# Patient Record
Sex: MALE | Race: WHITE | NOT HISPANIC OR LATINO
[De-identification: names, ages, dates, MRNs, and addresses within clinical notes are randomized per-mention and may not be internally consistent; named-entity substitution may affect disease eponyms.]

---

## 2022-04-01 PROBLEM — Z00.00 ENCOUNTER FOR PREVENTIVE HEALTH EXAMINATION: Status: ACTIVE | Noted: 2022-04-01

## 2022-04-04 ENCOUNTER — APPOINTMENT (OUTPATIENT)
Dept: CT IMAGING | Facility: CLINIC | Age: 77
End: 2022-04-04
Payer: MEDICARE

## 2022-04-04 PROCEDURE — 76376 3D RENDER W/INTRP POSTPROCES: CPT | Mod: 59,LT

## 2022-04-04 PROCEDURE — 73700 CT LOWER EXTREMITY W/O DYE: CPT | Mod: LT,MH

## 2022-04-07 ENCOUNTER — OUTPATIENT (OUTPATIENT)
Dept: OUTPATIENT SERVICES | Facility: HOSPITAL | Age: 77
LOS: 1 days | Discharge: HOME | End: 2022-04-07
Payer: MEDICARE

## 2022-04-07 DIAGNOSIS — Z01.818 ENCOUNTER FOR OTHER PREPROCEDURAL EXAMINATION: ICD-10-CM

## 2022-04-07 DIAGNOSIS — M16.12 UNILATERAL PRIMARY OSTEOARTHRITIS, LEFT HIP: ICD-10-CM

## 2022-04-07 DIAGNOSIS — Z98.890 OTHER SPECIFIED POSTPROCEDURAL STATES: Chronic | ICD-10-CM

## 2022-04-07 LAB
A1C WITH ESTIMATED AVERAGE GLUCOSE RESULT: 5.7 % — HIGH (ref 4–5.6)
ALBUMIN SERPL ELPH-MCNC: 3.6 G/DL — SIGNIFICANT CHANGE UP (ref 3.3–5)
ALP SERPL-CCNC: 92 U/L — SIGNIFICANT CHANGE UP (ref 40–120)
ALT FLD-CCNC: 91 U/L — HIGH (ref 12–78)
ANION GAP SERPL CALC-SCNC: 5 MMOL/L — SIGNIFICANT CHANGE UP (ref 5–17)
APTT BLD: 30.6 SEC — SIGNIFICANT CHANGE UP (ref 27.5–35.5)
AST SERPL-CCNC: 46 U/L — HIGH (ref 15–37)
BASOPHILS # BLD AUTO: 0.02 K/UL — SIGNIFICANT CHANGE UP (ref 0–0.2)
BASOPHILS NFR BLD AUTO: 0.2 % — SIGNIFICANT CHANGE UP (ref 0–2)
BILIRUB SERPL-MCNC: 0.6 MG/DL — SIGNIFICANT CHANGE UP (ref 0.2–1.2)
BLD GP AB SCN SERPL QL: SIGNIFICANT CHANGE UP
BUN SERPL-MCNC: 21 MG/DL — SIGNIFICANT CHANGE UP (ref 7–23)
CALCIUM SERPL-MCNC: 9.5 MG/DL — SIGNIFICANT CHANGE UP (ref 8.5–10.1)
CHLORIDE SERPL-SCNC: 106 MMOL/L — SIGNIFICANT CHANGE UP (ref 96–108)
CO2 SERPL-SCNC: 28 MMOL/L — SIGNIFICANT CHANGE UP (ref 22–31)
CREAT SERPL-MCNC: 1.16 MG/DL — SIGNIFICANT CHANGE UP (ref 0.5–1.3)
EGFR: 66 ML/MIN/1.73M2 — SIGNIFICANT CHANGE UP
EOSINOPHIL # BLD AUTO: 0.04 K/UL — SIGNIFICANT CHANGE UP (ref 0–0.5)
EOSINOPHIL NFR BLD AUTO: 0.4 % — SIGNIFICANT CHANGE UP (ref 0–6)
ESTIMATED AVERAGE GLUCOSE: 117 MG/DL — HIGH (ref 68–114)
GLUCOSE SERPL-MCNC: 97 MG/DL — SIGNIFICANT CHANGE UP (ref 70–99)
HCT VFR BLD CALC: 47.1 % — SIGNIFICANT CHANGE UP (ref 39–50)
HGB BLD-MCNC: 15.8 G/DL — SIGNIFICANT CHANGE UP (ref 13–17)
IMM GRANULOCYTES NFR BLD AUTO: 0.6 % — SIGNIFICANT CHANGE UP (ref 0–1.5)
INR BLD: 0.99 RATIO — SIGNIFICANT CHANGE UP (ref 0.88–1.16)
LYMPHOCYTES # BLD AUTO: 1.45 K/UL — SIGNIFICANT CHANGE UP (ref 1–3.3)
LYMPHOCYTES # BLD AUTO: 16.3 % — SIGNIFICANT CHANGE UP (ref 13–44)
MCHC RBC-ENTMCNC: 31.2 PG — SIGNIFICANT CHANGE UP (ref 27–34)
MCHC RBC-ENTMCNC: 33.5 G/DL — SIGNIFICANT CHANGE UP (ref 32–36)
MCV RBC AUTO: 92.9 FL — SIGNIFICANT CHANGE UP (ref 80–100)
MONOCYTES # BLD AUTO: 0.85 K/UL — SIGNIFICANT CHANGE UP (ref 0–0.9)
MONOCYTES NFR BLD AUTO: 9.5 % — SIGNIFICANT CHANGE UP (ref 2–14)
NEUTROPHILS # BLD AUTO: 6.5 K/UL — SIGNIFICANT CHANGE UP (ref 1.8–7.4)
NEUTROPHILS NFR BLD AUTO: 73 % — SIGNIFICANT CHANGE UP (ref 43–77)
NRBC # BLD: 0 /100 WBCS — SIGNIFICANT CHANGE UP (ref 0–0)
PLATELET # BLD AUTO: SIGNIFICANT CHANGE UP K/UL (ref 150–400)
POTASSIUM SERPL-MCNC: 4.2 MMOL/L — SIGNIFICANT CHANGE UP (ref 3.5–5.3)
POTASSIUM SERPL-SCNC: 4.2 MMOL/L — SIGNIFICANT CHANGE UP (ref 3.5–5.3)
PROT SERPL-MCNC: 8.6 GM/DL — HIGH (ref 6–8.3)
PROTHROM AB SERPL-ACNC: 11.9 SEC — SIGNIFICANT CHANGE UP (ref 10.5–13.4)
RBC # BLD: 5.07 M/UL — SIGNIFICANT CHANGE UP (ref 4.2–5.8)
RBC # FLD: 12.7 % — SIGNIFICANT CHANGE UP (ref 10.3–14.5)
SODIUM SERPL-SCNC: 139 MMOL/L — SIGNIFICANT CHANGE UP (ref 135–145)
WBC # BLD: 8.91 K/UL — SIGNIFICANT CHANGE UP (ref 3.8–10.5)
WBC # FLD AUTO: 8.91 K/UL — SIGNIFICANT CHANGE UP (ref 3.8–10.5)

## 2022-04-07 PROCEDURE — 93010 ELECTROCARDIOGRAM REPORT: CPT

## 2022-04-07 NOTE — PHYSICAL THERAPY INITIAL EVALUATION ADULT - TINETTI GAIT TEST, REHAB EVAL
Indication of gait -1/1,   Step Length and height -2/2,   Foot Clearance -/22,   Step Symmetry 0 /1,  Step Continuity -1/1,   Path -1/ 2,   Trunk -1/2,   Walking Time -1/1,   Total Score - 9/12

## 2022-04-07 NOTE — OCCUPATIONAL THERAPY INITIAL EVALUATION ADULT - PERTINENT HX OF CURRENT PROBLEM, REHAB EVAL
Pt is a 78 y/o male slated for elective surgery for posterior left  THR with MD Schulte on 4/20/22 due to OA, chronic pain and DJD. Pt denied any  falls in the past 3-6 months

## 2022-04-07 NOTE — H&P PST ADULT - PROBLEM SELECTOR PLAN 2
labs - cbc,pt/ptt,bmp,t&s,nose cx,ekg  M/C required  Instructed on if  nose cx positive use Mupirocin 5 days and checklist given  take routine meds DOS with sips of water. avoid NSAID and OTC supplements. verbalized understanding  information on proper nutrition , increase protein and better food choices provided in packet  anesthesiologist to review PST labs, EKG, medical clearance and optimization for surgery

## 2022-04-07 NOTE — OCCUPATIONAL THERAPY INITIAL EVALUATION ADULT - RANGE OF MOTION EXAMINATION, LOWER EXTREMITY
ROM is limited in left hip due to pain/Left LE Passive ROM was WFL (w/i functional limits)/Left LE Active Assistive ROM was WFL (within functional limits)/Right LE Active ROM was WFL   (within functional limits)/Right LE Passive ROM was WFL  (within functional limits)

## 2022-04-07 NOTE — OCCUPATIONAL THERAPY INITIAL EVALUATION ADULT - PRECAUTIONS/LIMITATIONS, REHAB EVAL
Pt's mobility and ADL management is limited due to pain in left hip/fall precautions Pt's mobility and ADL management is limited due to pain in left hip./fall precautions

## 2022-04-07 NOTE — H&P PST ADULT - HISTORY OF PRESENT ILLNESS
77 year old male with no significant past medical history c/o pain and limited ROM to left hip secondary to osteoarthritis.  He is scheduled for a robotic assisted left hip arthroplasty with SOFI on 4/20/22.    He denies fever, cough, SOB, and sick contacts. + Recent travels to [place] 3/2022    Goal: to walk without pain.

## 2022-04-07 NOTE — OCCUPATIONAL THERAPY INITIAL EVALUATION ADULT - LIVES WITH, PROFILE
in a private house with 6 steps to enter equipped with bilateral hand rails that cannot be reached simultaneously .. Once inside, pt has to negotiate a flight of stairs  6 steps left hadrail , plus a landing and 6 other steps with right handrail, to access the bedroom and bathroom. The bathroom has a walk in shower, fixed /retractable shower head  and  comfort height  toilet with adequate space to fit a commode over it./spouse

## 2022-04-07 NOTE — PHYSICAL THERAPY INITIAL EVALUATION ADULT - ADDITIONAL COMMENTS
There are 5-6 steps, c far travis rails,  at the entry of the house. There are 5-6 steps, c L rail up and followed by a landing and another 5-6 steps c R rail up, to negotiate at home.  Pt has a walk in shower c fixed/retractable shower head,  no grab bar, and comfort height toilet seat in BR. 3-1 commode will fit in BR. Average pain level at rest is 0/10. Pain increases to 9/10 c activities. Pt does not take any pain meds. Pt has no experience of adverse effects with pain medication. Pt is not on out-pt physical therapy at present. There is no h/o fall or knee buckling recently. Pt wears glasses for reading and no need for hearing aid. Pt is R handed and drives.

## 2022-04-07 NOTE — PHYSICAL THERAPY INITIAL EVALUATION ADULT - IMPAIRMENTS FOUND, PT EVAL
aerobic capacity/endurance/ergonomics and body mechanics/gait, locomotion, and balance/joint integrity and mobility/posture

## 2022-04-07 NOTE — OCCUPATIONAL THERAPY INITIAL EVALUATION ADULT - GENERAL OBSERVATIONS, REHAB EVAL
Chart reviewed. Patient encountered seated in chair in rehab preop room in [facility]. Patient underwent occupational therapy pre-operative consultation to determine current functional ADL limitations in order to provide the right equipment for patient to perform functional ADL post operation.

## 2022-04-07 NOTE — OCCUPATIONAL THERAPY INITIAL EVALUATION ADULT - ADDITIONAL COMMENTS
At this time, pt is functioning in his roles, self sufficient, driving & ambulating independently in the community with [employer]. Pt owns no other DME.  Pt c/o 9/10 pain in his left hip. The pain is exacerbated, by walking, prolonged standing, negotiating steps and is relieved with  rest. Pt scores 90% of patient specific scale.

## 2022-04-07 NOTE — PHYSICAL THERAPY INITIAL EVALUATION ADULT - PERTINENT HX OF CURRENT PROBLEM, REHAB EVAL
L knee OA c chronic pain and  limiting functional mobility. Pt is scheduled for L knee elective total joint replacement on 4/20/22  by  Dr. Schulte.

## 2022-04-07 NOTE — PHYSICAL THERAPY INITIAL EVALUATION ADULT - STANDING BALANCE: DYNAMIC, REHAB EVAL
c a straight cane/good minus I have personally performed a face to face diagnostic evaluation on this patient. I have reviewed the ACP note and agree with the history, exam and plan of care, except as noted.

## 2022-04-07 NOTE — PHYSICAL THERAPY INITIAL EVALUATION ADULT - GENERAL OBSERVATIONS, REHAB EVAL
Patient was seen seated  in chair in PT/OT preoperative assessment room with no apparent distress. Height:   6'1"    ; weight :  275   lbs.

## 2022-04-07 NOTE — PHYSICAL THERAPY INITIAL EVALUATION ADULT - TINETTI BALANCE TEST, REHAB EVAL
Sitting Balance - 1/1 , Rises From Chair - 1/2, Attempts to rise - 2/2 , Immediate Standing Balance - 2/2,  Standing Balance - 2/2, Nudged -  2/2, Eyes Closed - 1/1,  Turning 360 Deg -  1/2, Sitting down - 1/2, Balance Score - 13/16

## 2022-04-07 NOTE — H&P PST ADULT - EKG AND INTERPRETATION
[]Hide copied text  [facility] Medicine  H&P     Patient Name: Randall Strickland Jr.  MRN: [id number]  Patient Class: OP- Observation   Admission Date: 2/9/2017  Length of Stay: 0 days  Attending Physician: Mando Garibay MD  Primary Care Provider: Hieu Monk MD     [facility] Medicine Team: [facility] MED  Mando Garibay MD     Subjective:      Principal Problem:Acute cystitis with hematuria     HPI:   Patient is a 89-year-old male with past medical history of HTN, congestive heart, aortic stenosis status post TAVR in Aug 2016 who presents the ED with fever and urinary difficulties and referral to ED from cardiologist. Patient states that his symptoms started 2 days ago. Initially he had increased urinary frequency with incontinence, then had dysuria and some spotty hematuria. Last day his voiding decreased a lot Patient had a fever of 101.3 last night.   He has been taking Excedrin and aspirin. He denies any abdominal plain or flank pain. He denies any diaphoresis, cough, SOB, chest pain, abdominal pain, nausea, vomiting, diarrhea, flank pain, paresthesias, or weakness.     Patient states that he saw his cardiologist today, and was told to report to the ED for further evaluation and management.         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    Stenosis of aortic and mitral valves         aortic DAHLIA 1.1 CM                Past Surgical History   Procedure Laterality Date    Knee surgery           Bilateral    Total hip arthroplasty           Bilaterial    Cararact   car    Cataract extraction bilateral w/ anterior vitrectomy           bilaterial    Cholecystectomy        Appendectomy        Eye surgery        Joint replacement         Cataract extraction w/ intraocular lens implant Bilateral      Cardiac catheterization        Cardiac valve surgery             Review of patient's allergies indicates:  No Known Allergies         Current Facility-Administered Medications on File Prior to Encounter   Medication    [DISCONTINUED] dextranomer-hyaluronate-NaCl (SOLESTA) 50-15 mg/mL (4) GlIm 1 kit           Current Outpatient Prescriptions on File Prior to Encounter   Medication Sig    amlodipine (NORVASC) 5 MG tablet Take 1 tablet (5 mg total) by mouth once daily.    aspirin 81 MG Chew Take 81 mg by mouth once daily.     CHOLESTYRAMINE LIGHT 4 gram packet DISSOLVE 1 PACKET IN LIQUID AND DRINK TWICE DAILY    clopidogrel (PLAVIX) 75 mg tablet Take 1 tablet (75 mg total) by mouth once daily. Take 4 tablets the night before the procedure, then 1 tab PO daily.    finasteride (PROSCAR) 5 mg tablet Take 1 tablet (5 mg total) by mouth once daily.    gabapentin (NEURONTIN) 100 MG capsule TAKE 1 CAPSULE BY MOUTH EVERY EVENING    ipratropium (ATROVENT) 0.03 % nasal spray USE 1 TO 2 SPRAYS IN EACH NOSTRIL TWICE DAILY AS NEEDED    omeprazole (PRILOSEC) 40 MG capsule TAKE 1 CAPSULE(40 MG) BY MOUTH EVERY MORNING    tamsulosin (FLOMAX) 0.4 mg Cp24 TAKE ONE CAPSULE BY MOUTH DAILY    torsemide (DEMADEX) 5 MG Tab Take 1 tablet (5 mg total) by mouth once daily.    trospium (SANCTURA) 20 mg Tab tablet Take 1 tablet (20 mg total) by mouth 2 (two) times daily.      Family History     Problem Relation (Age of Onset)     No Known Problems Father, Mother, Sister, Brother, Maternal Aunt, Maternal Uncle, Paternal Aunt, Paternal Uncle, Maternal Grandmother, Maternal Grandfather, Paternal Grandmother, Paternal Grandfather                 Social History Main Topics     Smoking status: Never Smoker     Smokeless tobacco: Never Used     Alcohol use Yes         Comment: occasional     Drug use: No     Sexual activity: Not on file       Review of Systems    Constitutional: Positive for fever.   HENT: Negative for congestion.   Eyes: Negative for visual disturbance.   Respiratory: Negative for shortness of breath.   Cardiovascular: Negative for chest pain.   Gastrointestinal: Negative for abdominal pain.   Endocrine: Negative for cold intolerance.   Genitourinary: Positive for dysuria, frequency and hematuria. Negative for flank pain.   Musculoskeletal: Negative for arthralgias.   Skin: Negative for color change.   Allergic/Immunologic: Negative for immunocompromised state.   Neurological: Negative for dizziness.   Hematological: Does not bruise/bleed easily.   Psychiatric/Behavioral: Negative for agitation.      Objective:      Vital Signs (Most Recent):  Temp: 99.2 °F (37.3 °C) (02/09/17 1622)  Pulse: 91 (02/09/17 1550)  Resp: 18 (02/09/17 1550)  BP: 134/60 (02/09/17 1550)  SpO2: 98 % (02/09/17 1550) Vital Signs (24h Range):  Temp: [98.8 °F (37.1 °C)-99.2 °F (37.3 °C)] 99.2 °F (37.3 °C)  Pulse: [] 91  Resp: [16-18] 18  SpO2: [96 %-98 %] 98 %  BP: (125-141)/(60-63) 134/60      Weight: 81.6 kg (180 lb)  Body mass index is 29.05 kg/(m^2).     Physical Exam   Constitutional: He is oriented to person, place, and time. No distress.   HENT:   Head: Atraumatic.   Eyes: No scleral icterus.   Neck: No JVD present.   Cardiovascular: Normal rate and regular rhythm.   Murmur heard.  Pulmonary/Chest: Effort normal and breath sounds normal. No respiratory distress.   Abdominal: Soft. Bowel sounds are normal. He exhibits no distension.   Musculoskeletal: He exhibits no edema.   Neurological: He is alert and oriented to person, place, and time.   Skin: Skin is warm and dry.   Psychiatric: He has a normal mood and affect.          Significant Labs: All pertinent labs within the past 24 hours have been reviewed.  None     Significant Imaging: I have reviewed and interpreted all pertinent imaging results/findings within the past 24 hours.     Assessment/Plan:       * Acute  cystitis with hematuria  BPH with urinary obstruction  Sepsis  UA >100 WBC, WBC 27k, fever at home, and mild tachycardia        - abx and f/u culture  - bladder scan in case pt has obstruction and needs blevins              Chronic kidney disease, stage III (moderate)  Volume depletion  Cr 1.4 -> 1.7  - LR for one liter        Hypertension, essential  -- acceptable; continue with current treatment & monitor         Chronic diastolic congestive heart failure  -- chronic and stable; continue with home treatments & monitor         VTE Risk Mitigation           Ordered       enoxaparin injection 30 mg Daily    Route: Subcutaneous          02/09/17 1706       Medium Risk of VTE Once      02/09/17 1706            Mando Garibay MD  Department of Hospital Medicine   [facility]                         [facility]

## 2022-04-07 NOTE — H&P PST ADULT - ASSESSMENT
77 year old male with no significant past medical history c/o pain and limited ROM to left hip secondary to osteoarthritis.  He is scheduled for a robotic assisted left hip arthroplasty with SOFI on 22.    CAPRINI SCORE [CLOT]    AGE RELATED RISK FACTORS                                                       MOBILITY REL  ATED FACTORS  [ ] Age 41-60 years                                            (1 Point)                  [ ] Bed rest                                                        (1 Point)  [ ] Age: 61-74 years                                           (2 Points)                 [ ] Plaster cast                                                   (2 Points)  [x ] Age= 75 years                                              (3 Points)                 [ ] Bed bound for more than 72 hours                 (2 Points)    DISEASE RELATED RISK FACTORS                                               GENDER SPECIFIC FACTORS  [ x] Edema in the lower extremities                       (1 Point)                  [ ] Pregnancy                                                     (1 Point)  [ ] Varicose veins                                               (1 Point)                  [ ] Post-partum < 6 weeks                                   (1 Point)             [x ] BMI > 25 Kg/m2                                            (1 Point)                  [ ] Hormonal therapy  or oral contraception          (1 Point)                 [ ] Sepsis (in the previous month)                        (1 Point)                  [ ] History of pregnancy complications                 (1 point)  [ ] Pneumonia or serious lung disease                                               [ ] Unexplained or recurrent                     (1 Point)           (in the previous month)                               (1 Point)  [ ] Abnormal pulmonary function test                     (1 Point)                 SURGERY RELATED RISK FACTORS  [ ] Acute myocardial infarction                              (1 Point)                 [ ]  Section                                             (1 Point)  [ ] Congestive heart failure (in the previous month)  (1 Point)               [ ] Minor surgery                                                  (1 Point)   [ ] Inflammatory bowel disease                             (1 Point)                 [ ] Arthroscopic surgery                                        (2 Points)  [ ] Central venous access                                      (2 Points)                [ ] General surgery lasting more than 45 minutes   (2 Points)       [ ] Stroke (in the previous month)                          (5 Points)               [x ] Elective arthroplasty                                         (5 Points)                                                                                                                                               HEMATOLOGY RELATED FACTORS                                                 TRAUMA RELATED RISK FACTORS  [ ] Prior episodes of VTE                                     (3 Points)                [ ] Fracture of the hip, pelvis, or leg                       (5 Points)  [ ] Positive family history for VTE                         (3 Points)                 [ ] Acute spinal cord injury (in the previous month)  (5 Points)  [ ] Prothrombin 34811 A                                     (3 Points)                 [ ] Paralysis  (less than 1 month)                             (5 Points)  [ ] Factor V Leiden                                             (3 Points)                  [ ] Multiple Trauma within 1 month                        (5 Points)  [ ] Lupus anticoagulants                                     (3 Points)                                                           [ ] Anticardiolipin antibodies                               (3 Points)                                                       [ ] High homocysteine in the blood                      (3 Points)                                             [ ] Other congenital or acquired thrombophilia      (3 Points)                                                [ ] Heparin induced thrombocytopenia                  (3 Points)                                          Total Score [       10   ]    Caprini Score 0 - 2:  Low Risk, No VTE Prophylaxis required for most patients, encourage ambulation  Caprini Score 3 - 6:  At Risk, pharmacologic VTE prophylaxis is indicated for most patients (in the absence of a contraindication)  Caprini Score Greater than or = 7:  High Risk, pharmacologic VTE prophylaxis is indicated for most patients (in the absence of a contraindication)    Caprini score indicates that the patient is high risk for VTE event ( score 6 or greater). Surgical patient's in this group will benefit from both pharmacologic prophylaxis and intermittent compression devices . Surgical team will determine the balance between VTE  risk and bleeding risk and other clinical considerations

## 2022-04-08 LAB
MRSA PCR RESULT.: SIGNIFICANT CHANGE UP
S AUREUS DNA NOSE QL NAA+PROBE: DETECTED
VIT D25+D1,25 OH+D1,25 PNL SERPL-MCNC: 29.3 PG/ML — SIGNIFICANT CHANGE UP (ref 19.9–79.3)

## 2022-04-08 RX ORDER — MUPIROCIN 20 MG/G
1 OINTMENT TOPICAL
Qty: 22 | Refills: 0
Start: 2022-04-08 | End: 2022-04-12

## 2022-04-13 PROBLEM — Z78.9 OTHER SPECIFIED HEALTH STATUS: Chronic | Status: ACTIVE | Noted: 2022-04-07

## 2022-04-20 ENCOUNTER — TRANSCRIPTION ENCOUNTER (OUTPATIENT)
Age: 77
End: 2022-04-20

## 2022-04-20 ENCOUNTER — APPOINTMENT (OUTPATIENT)
Dept: ORTHOPEDIC SURGERY | Facility: HOSPITAL | Age: 77
End: 2022-04-20
Payer: MEDICARE

## 2022-04-20 ENCOUNTER — RESULT REVIEW (OUTPATIENT)
Age: 77
End: 2022-04-20

## 2022-04-20 ENCOUNTER — OUTPATIENT (OUTPATIENT)
Dept: OUTPATIENT SERVICES | Facility: HOSPITAL | Age: 77
LOS: 1 days | Discharge: HOME HEALTH | End: 2022-04-20

## 2022-04-20 DIAGNOSIS — Z98.890 OTHER SPECIFIED POSTPROCEDURAL STATES: Chronic | ICD-10-CM

## 2022-04-20 PROCEDURE — 23130 ACROMP/ACROMIONECTOMY PRTL: CPT | Mod: LT,22

## 2022-04-21 ENCOUNTER — TRANSCRIPTION ENCOUNTER (OUTPATIENT)
Age: 77
End: 2022-04-21

## 2022-04-21 DIAGNOSIS — M16.12 UNILATERAL PRIMARY OSTEOARTHRITIS, LEFT HIP: ICD-10-CM

## 2022-04-21 RX ORDER — GABAPENTIN 300 MG/1
300 CAPSULE ORAL TWICE DAILY
Qty: 28 | Refills: 0 | Status: ACTIVE | COMMUNITY
Start: 2022-04-21 | End: 1900-01-01

## 2022-04-21 RX ORDER — ESOMEPRAZOLE MAGNESIUM 20 MG/1
20 CAPSULE, DELAYED RELEASE ORAL DAILY
Qty: 28 | Refills: 0 | Status: ACTIVE | COMMUNITY
Start: 2022-04-21 | End: 1900-01-01

## 2022-04-21 RX ORDER — ASPIRIN ENTERIC COATED TABLETS 81 MG 81 MG/1
81 TABLET, DELAYED RELEASE ORAL TWICE DAILY
Qty: 56 | Refills: 0 | Status: ACTIVE | COMMUNITY
Start: 2022-04-21 | End: 1900-01-01

## 2022-04-21 RX ORDER — CELECOXIB 200 MG/1
200 CAPSULE ORAL TWICE DAILY
Qty: 56 | Refills: 0 | Status: ACTIVE | COMMUNITY
Start: 2022-04-21 | End: 1900-01-01

## 2022-04-21 RX ORDER — DOCUSATE SODIUM 100 MG/1
100 CAPSULE ORAL TWICE DAILY
Qty: 56 | Refills: 0 | Status: ACTIVE | COMMUNITY
Start: 2022-04-21 | End: 1900-01-01

## 2022-04-21 RX ORDER — CYCLOBENZAPRINE HYDROCHLORIDE 10 MG/1
10 TABLET, FILM COATED ORAL 3 TIMES DAILY
Qty: 21 | Refills: 0 | Status: ACTIVE | COMMUNITY
Start: 2022-04-21 | End: 1900-01-01

## 2022-04-21 RX ORDER — ACETAMINOPHEN 500 MG/1
500 TABLET, COATED ORAL 3 TIMES DAILY
Qty: 84 | Refills: 0 | Status: ACTIVE | COMMUNITY
Start: 2022-04-21 | End: 1900-01-01

## 2022-04-26 ENCOUNTER — FORM ENCOUNTER (OUTPATIENT)
Age: 77
End: 2022-04-26

## 2022-04-27 DIAGNOSIS — M16.12 UNILATERAL PRIMARY OSTEOARTHRITIS, LEFT HIP: ICD-10-CM

## 2022-04-27 DIAGNOSIS — M25.552 PAIN IN LEFT HIP: ICD-10-CM

## 2022-04-27 DIAGNOSIS — Z87.891 PERSONAL HISTORY OF NICOTINE DEPENDENCE: ICD-10-CM

## 2022-05-03 ENCOUNTER — APPOINTMENT (OUTPATIENT)
Dept: ORTHOPEDIC SURGERY | Facility: CLINIC | Age: 77
End: 2022-05-03

## 2022-05-05 ENCOUNTER — APPOINTMENT (OUTPATIENT)
Dept: ORTHOPEDIC SURGERY | Facility: CLINIC | Age: 77
End: 2022-05-05
Payer: MEDICARE

## 2022-05-05 VITALS — WEIGHT: 275 LBS | HEIGHT: 73 IN | BODY MASS INDEX: 36.45 KG/M2

## 2022-05-05 DIAGNOSIS — Z78.9 OTHER SPECIFIED HEALTH STATUS: ICD-10-CM

## 2022-05-05 PROCEDURE — 99024 POSTOP FOLLOW-UP VISIT: CPT

## 2022-05-05 PROCEDURE — 73502 X-RAY EXAM HIP UNI 2-3 VIEWS: CPT | Mod: LT

## 2022-05-05 RX ORDER — OXYCODONE 5 MG/1
5 TABLET ORAL
Qty: 42 | Refills: 0 | Status: ACTIVE | COMMUNITY
Start: 2022-05-05 | End: 1900-01-01

## 2022-05-05 RX ORDER — CEPHALEXIN 500 MG/1
500 CAPSULE ORAL TWICE DAILY
Qty: 28 | Refills: 0 | Status: ACTIVE | COMMUNITY
Start: 2022-05-05 | End: 1900-01-01

## 2022-05-05 NOTE — ASSESSMENT
[FreeTextEntry1] : 3-31-22 Left Eric JOSE. Risk of procedure discussed with patient at length in addition to postoperative course. Would like to have procedure in April/July. Planning trip in June. [facility] CT provided to patient. WIll call to schedule and see patient prior to surgery.  = Has tried PT, NSAIDs with temporary but not long lasting relief.  \par \par 05/05/2022 s/p Left JOSE and doing well. start PT with 2 week followup\par - keflex 500 BID for cellulitis

## 2022-05-05 NOTE — IMAGING
[de-identified] : Constitutional: well developed and well nourished, able to communicate\par Cardiovascular: Peripheral vascular exam is grossly normal\par Neurologic: Alert and oriented, no acute distress.\par Skin: normal skin with no ulcers, rashes, or lesions\par Pulmonary: No respiratory distress, breathing comfortably on room air\par Lymphatics: No obvious lymphadenopathy or lymphedema in areas examined\par \par LOWER EXTREMITY/LEFT HIP EXAM\par Standing pelvic alignment: Symmetric with no Trendelenburg\par Atrophy: none\par Ecchymosis/swelling: mild\par + erythema\par \par Range of Motion\par Hip: Flexion/extension/ER/IR     / EX 20/ ER 45/ IR 20 / AB 60 /AD 20\par Impingement with flexion adduction and internal rotation: negative\par Contracture: none\par Snapping hip: negative\par Greater trochanter: no tenderness\par healing surgical incisions.\par \par Neurovascular\par Distal extremities: warm to touch\par Sensation to light touch: intact\par Muscle strength: 5/5\par \par IMAGING:\par 05/05/2022 Xrays of the Left hip 3v were taken today demonstrating hardware in place w/o signs of loosening\par \par

## 2022-05-05 NOTE — HISTORY OF PRESENT ILLNESS
[4] : 4 [2] : 2 [Dull/Aching] : dull/aching [Rest] : rest [Walking] : walking [] : Post Surgical Visit: yes [de-identified] : Mr. Jory Hernandez, a 76-year-old male, presents today for left hip/buttock pain. worse with walking. No numbness/tingling. No NSAIDs or PT\par \par 05/05/2022 s/p JOSE left on 4/20 and doing well [FreeTextEntry1] : left hip  [de-identified] : 4/20/22

## 2022-05-06 ENCOUNTER — APPOINTMENT (OUTPATIENT)
Dept: ORTHOPEDIC SURGERY | Facility: HOSPITAL | Age: 77
End: 2022-05-06

## 2022-05-19 ENCOUNTER — APPOINTMENT (OUTPATIENT)
Dept: ORTHOPEDIC SURGERY | Facility: CLINIC | Age: 77
End: 2022-05-19
Payer: MEDICARE

## 2022-05-19 VITALS — HEIGHT: 73 IN | BODY MASS INDEX: 36.45 KG/M2 | WEIGHT: 275 LBS

## 2022-05-19 PROCEDURE — 99024 POSTOP FOLLOW-UP VISIT: CPT

## 2022-05-19 RX ORDER — OXYCODONE 5 MG/1
5 TABLET ORAL
Qty: 20 | Refills: 0 | Status: ACTIVE | COMMUNITY
Start: 2022-05-19 | End: 1900-01-01

## 2022-05-19 NOTE — IMAGING
[de-identified] : Constitutional: well developed and well nourished, able to communicate\par Cardiovascular: Peripheral vascular exam is grossly normal\par Neurologic: Alert and oriented, no acute distress.\par Skin: normal skin with no ulcers, rashes, or lesions\par Pulmonary: No respiratory distress, breathing comfortably on room air\par Lymphatics: No obvious lymphadenopathy or lymphedema in areas examined\par \par POSTOP LEFT HIP EXAM\par Edema: mild\par well healing surgical incision without surrounding erythema/drainage\par \par ROM\par 0-90 degrees of flexion\par No pain with IR/ER ROM\par \par Neurovascular exam\par Motor function 5/5 distal lower extremity\par Sensation to light touch: intact\par Distal pulses: 2+\par \par IMAGING\par LEft jacob in place

## 2022-05-19 NOTE — HISTORY OF PRESENT ILLNESS
[Gradual] : gradual [4] : 4 [0] : 0 [Tightness] : tightness [Occasional] : occasional [Rest] : rest [Meds] : meds [Ice] : ice [Heat] : heat [Massage] : massage [Physical therapy] : physical therapy [Walking] : walking [] : no [FreeTextEntry1] : left hip  [FreeTextEntry9] : goes for pt  3 times a week , takes tylenol and takes pain meds as  needed  [de-identified] : patient cannot sleep on left side and has to put a pillow to support, overall he feels he has improved  [de-identified] : 4/20/22 [de-identified] : Left JOSE

## 2022-05-19 NOTE — ASSESSMENT
[FreeTextEntry1] : 77 year M 4 weeks s/p Left posterior JOSE here for wound check\par - incision healed, oxy re-prescribed. fu 2 weeks for 6 week fu

## 2022-06-09 ENCOUNTER — APPOINTMENT (OUTPATIENT)
Dept: ORTHOPEDIC SURGERY | Facility: CLINIC | Age: 77
End: 2022-06-09
Payer: MEDICARE

## 2022-06-09 VITALS — BODY MASS INDEX: 36.45 KG/M2 | WEIGHT: 275 LBS | HEIGHT: 73 IN

## 2022-06-09 DIAGNOSIS — Z96.642 PRESENCE OF LEFT ARTIFICIAL HIP JOINT: ICD-10-CM

## 2022-06-09 PROCEDURE — 99024 POSTOP FOLLOW-UP VISIT: CPT

## 2022-06-09 NOTE — HISTORY OF PRESENT ILLNESS
[5] : 5 [2] : 2 [Dull/Aching] : dull/aching [Sharp] : sharp [] : This patient has had an injection before: no [FreeTextEntry1] : left hip [de-identified] : 4/22/22 [de-identified] : hip

## 2022-06-09 NOTE — ASSESSMENT
[FreeTextEntry1] : 77 year M s/p left JOSE on 5/6 and doing well. heading to [facility], [place], operated by [facility], PT provided for him.\par - still having back pain, will see spine specialist when he returns 6 months

## 2023-02-16 NOTE — H&P PST ADULT - NEUROLOGICAL
Caller: Betty Dunham    Relationship: Self    Best call back number: [phone]    Requested Prescriptions:   Requested Prescriptions     Pending Prescriptions Disp Refills   • rizatriptan MLT (MAXALT-MLT) 10 MG disintegrating tablet 12 tablet 3     Sig: May repeat in 2 hours if needed        Pharmacy where request should be sent:   [facility], [place] -[phone] [facility], [place] - [address] [facility] [phone] [place] [phone]     Additional details provided by patient: PATIENT HAS SCHEDULED THE FIRST AVAILABLE APPOINTMENT WITH DR TALAVERA WHICH IS 03/10/23.     PLEASE REFILL THIS PRESCRIPTION AS PATIENT IS OUT OF THIS MEDICATION    Does the patient have less than a 3 day supply:  [x] Yes  [] No    Would you like a call back once the refill request has been completed: [x] Yes [] No    If the office needs to give you a call back, can they leave a voicemail: [x] Yes [] No    Tyrone Stephenson Rep   02/16/23 12:19 EST              negative Alert & oriented; no sensory, motor or coordination deficits, normal reflexes

## 2025-04-08 NOTE — OCCUPATIONAL THERAPY INITIAL EVALUATION ADULT - KINESTHESIA, RUE, OT EVAL
You  Niki GongJust now (4:45 PM)     MICHAEL Prince,     Thank you for the information. I will update Charo for your upcoming appt.     Katheryn OTERO    This Patient Portal message has not been read.      Brenda Luevano  [facility] Nurse Triage Msg Pool8 minutes ago (4:36 PM)     JL  Please see response Thank you        Niki EWING [facility]-Scheduling Msg Pool16 minutes ago (4:28 PM)       According to [employer] images, it's in the right area, right below the sternum. And occasionally I can feel a lump and it's extremely painful to touch. Pain comes and goes, very bad heartburn, omeprozol doesn't help. I do have a sore or irritated throat. I haven't tried any medication in case I throw it up. And lots of belching.       You  Niki Gong20 minutes ago (4:24 PM)     MICHAEL Prince,     Where are you having this pain when you say the right area? Can you provide more information. Does the pain come and go or seem constant? Do you have concerns with heartburn or indigestion? Irritation to your throat? Have you taken anything for the pain? Do you experience any belching?     Brenda Dunlap RN Nurse Triage Msg Pool3 hours ago (12:59 PM)     REILLY  Please review and triage if needed thank you        ION Hayden4 hours ago (12:20 PM)       Patient questionnaire submission  --------------------------------  Your response has been received.       Niki EWING [facility]Scheduling Msg Pool4 hours ago (12:19 PM)       Appointment for: Niki Gong ([id number])  Visit type: PC ACUTE VIRTUAL ([id number])  4/10/2025 9:30 AM (30 minutes) with ION Albright in [facility]     Patient comments:  I think I have a hiatal hernia. The pain is in the right area, but unsure about imaging.   within normal limits